# Patient Record
Sex: FEMALE | Race: WHITE | NOT HISPANIC OR LATINO | Employment: FULL TIME | URBAN - METROPOLITAN AREA
[De-identification: names, ages, dates, MRNs, and addresses within clinical notes are randomized per-mention and may not be internally consistent; named-entity substitution may affect disease eponyms.]

---

## 2017-01-08 ENCOUNTER — OFFICE VISIT (OUTPATIENT)
Dept: URGENT CARE | Facility: PHYSICIAN GROUP | Age: 27
End: 2017-01-08
Payer: COMMERCIAL

## 2017-01-08 VITALS
DIASTOLIC BLOOD PRESSURE: 68 MMHG | RESPIRATION RATE: 18 BRPM | HEART RATE: 90 BPM | TEMPERATURE: 99 F | SYSTOLIC BLOOD PRESSURE: 118 MMHG | WEIGHT: 156 LBS | OXYGEN SATURATION: 98 %

## 2017-01-08 DIAGNOSIS — J06.9 ACUTE URI: ICD-10-CM

## 2017-01-08 DIAGNOSIS — H66.003 ACUTE SUPPURATIVE OTITIS MEDIA OF BOTH EARS WITHOUT SPONTANEOUS RUPTURE OF TYMPANIC MEMBRANES, RECURRENCE NOT SPECIFIED: Primary | ICD-10-CM

## 2017-01-08 PROCEDURE — 99214 OFFICE O/P EST MOD 30 MIN: CPT | Performed by: PHYSICIAN ASSISTANT

## 2017-01-08 RX ORDER — FLUCONAZOLE 150 MG/1
150 TABLET ORAL DAILY
Qty: 1 TAB | Refills: 0 | Status: SHIPPED | OUTPATIENT
Start: 2017-01-08 | End: 2018-09-14

## 2017-01-08 RX ORDER — AMOXICILLIN 875 MG/1
875 TABLET, COATED ORAL 2 TIMES DAILY
Qty: 20 TAB | Refills: 0 | Status: SHIPPED | OUTPATIENT
Start: 2017-01-08 | End: 2018-09-14

## 2017-01-08 ASSESSMENT — ENCOUNTER SYMPTOMS: COUGH: 1

## 2017-01-08 NOTE — MR AVS SNAPSHOT
Elayne Espitia   2017 11:45 AM   Office Visit   MRN: [id number]    Department:  [facility]   Dept Phone:  [phone]    Description:  Female : 1990   Provider:  Zulma Banda PA-C           Reason for Visit     Cough congestion, HA, ear pain x 4 days      Allergies as of 2017     Allergen Noted Reactions    Latex 2017   Rash    Site specific    Sulfa Drugs 2017   Swelling      You were diagnosed with     Acute suppurative otitis media of both ears without spontaneous rupture of tympanic membranes, recurrence not specified   [[id number]]  -  Primary     Acute URI   [[id number]]         Vital Signs     Blood Pressure Pulse Temperature Respirations Weight Oxygen Saturation    118/68 mmHg 90 37.2 °C (99 °F) 18 70.761 kg (156 lb) 98%      Basic Information     Date Of Birth Sex Race Ethnicity Preferred Language    1990 Female White Non- English      Health Maintenance        Date Due Completion Dates    IMM HEP B VACCINE (1 of 3 - Primary Series) 1990 ---    IMM HEP A VACCINE (1 of 2 - Standard Series) 3/9/1991 ---    IMM HPV VACCINE (1 of 3 - Female 3 Dose Series) 3/9/2001 ---    IMM VARICELLA (CHICKENPOX) VACCINE (1 of 2 - 2 Dose Adolescent Series) 3/9/2003 ---    IMM DTaP/Tdap/Td Vaccine (1 - Tdap) 3/9/2009 ---    PAP SMEAR 3/4/2014 3/4/2011    IMM INFLUENZA (1) 2016 ---            Current Immunizations     No immunizations on file.      Below and/or attached are the medications your provider expects you to take. Review all of your home medications and newly ordered medications with your provider and/or pharmacist. Follow medication instructions as directed by your provider and/or pharmacist. Please keep your medication list with you and share with your provider. Update the information when medications are discontinued, doses are changed, or new medications (including over-the-counter products) are added; and carry medication information at all times in  the event of emergency situations     Allergies:  LATEX - Rash     SULFA DRUGS - Swelling               Medications  Valid as of: January 08, 2017 - 12:23 PM    Generic Name Brand Name Tablet Size Instructions for use    Amoxicillin (Tab) AMOXIL 875 MG Take 1 Tab by mouth 2 times a day.        Azithromycin (Tab) ZITHROMAX 250 MG 2 tabs by mouth day 1, 1 tab by mouth days 2-5        Fluconazole (Tab) DIFLUCAN 150 MG Take 1 Tab by mouth every day.        Norgestrel-Ethinyl Estradiol (Tab) LO-OVRAL 0.3-30 MG-MCG Take 1 Tab by mouth every day.        Pseudoephedrine HCl (Tab) SUDAFED 30 MG Take 60 mg by mouth every four hours as needed.        .                 Medicines prescribed today were sent to:     [id number] - [place], [place] - [address] [address] [facility] [address]    Phone: [phone] Fax: [phone]    Open 24 Hours?: No      Medication refill instructions:       If your prescription bottle indicates you have medication refills left, it is not necessary to call your provider’s office. Please contact your pharmacy and they will refill your medication.    If your prescription bottle indicates you do not have any refills left, you may request refills at any time through one of the following ways: The online [employer] system (except Urgent Care), by calling your provider’s office, or by asking your pharmacy to contact your provider’s office with a refill request. Medication refills are processed only during regular business hours and may not be available until the next business day. Your provider may request additional information or to have a follow-up visit with you prior to refilling your medication.   *Please Note: Medication refills are assigned a new Rx number when refilled electronically. Your pharmacy may indicate that no refills were authorized even though a new prescription for the same medication is available at the pharmacy. Please request the medicine by name with the pharmacy before  contacting your provider for a refill.           [employer] Access Code: [email or web address]  Expires: 2/7/2017 11:41 AM    [employer]  A secure, online tool to manage your health information     [employer]’s [employer]® is a secure, online tool that connects you to your personalized health information from the privacy of your home -- day or night - making it very easy for you to manage your healthcare. Once the activation process is completed, you can even access your medical information using the [employer] cheyanne, which is available for free in the Apple Cheyanne store or Google Play store.     [employer] provides the following levels of access (as shown below):   My Chart Features   [facility] Primary Care Doctor [facility]  Specialists [facility]  Urgent  Care Non-[facility]  Primary Care  Doctor   Email your healthcare team securely and privately 24/7 X X X    Manage appointments: schedule your next appointment; view details of past/upcoming appointments X      Request prescription refills. X      View recent personal medical records, including lab and immunizations X X X X   View health record, including health history, allergies, medications X X X X   Read reports about your outpatient visits, procedures, consult and ER notes X X X X   See your discharge summary, which is a recap of your hospital and/or ER visit that includes your diagnosis, lab results, and care plan. X X       How to register for [employer]:  1. Go to  [email or web address].  2. Click on the Sign Up Now box, which takes you to the New Member Sign Up page. You will need to provide the following information:  a. Enter your [employer] Access Code exactly as it appears at the top of this page. (You will not need to use this code after you’ve completed the sign-up process. If you do not sign up before the expiration date, you must request a new code.)   b. Enter your date of birth.   c. Enter your home email address.   d. Click Submit, and follow the next screen’s  instructions.  3. Create a [employer] ID. This will be your [employer] login ID and cannot be changed, so think of one that is secure and easy to remember.  4. Create a [employer] password. You can change your password at any time.  5. Enter your Password Reset Question and Answer. This can be used at a later time if you forget your password.   6. Enter your e-mail address. This allows you to receive e-mail notifications when new information is available in [employer].  7. Click Sign Up. You can now view your health information.    For assistance activating your [employer] account, call [phone]

## 2017-01-08 NOTE — PROGRESS NOTES
Subjective:      Elayne Espitia is a 26 y.o. female who presents with Cough    PMH:  has a past medical history of Urinary tract infection, site not specified and Bleeding from the nose.  MEDS:   Current outpatient prescriptions:   •  norgestrel-ethinyl estradiol (CRYSELLE-28) 0.3-30 MG-MCG Tab, Take 1 Tab by mouth every day., Disp: , Rfl:   •  pseudoephedrine (SUDAFED) 30 MG TABS, Take 60 mg by mouth every four hours as needed., Disp: , Rfl:   •  azithromycin (ZITHROMAX) 250 MG TABS, 2 tabs by mouth day 1, 1 tab by mouth days 2-5 (Patient not taking: Reported on 1/8/2017), Disp: 6 Tab, Rfl: 0  ALLERGIES:   Allergies   Allergen Reactions   • Latex Rash     Site specific   • Sulfa Drugs Swelling     SURGHX: No past surgical history on file.  SOCHX:  reports that she has never smoked. She does not have any smokeless tobacco history on file. She reports that she does not drink alcohol or use illicit drugs.  FH: family history includes Cancer in her father; Diabetes in her maternal grandfather, maternal grandmother, paternal grandfather, and paternal grandmother.          HPI Comments: Patient presents with:  Cough: congestion, HA, ear pain x 4 days.        Cough  This is a new problem. The current episode started in the past 7 days. The problem has been gradually worsening. The cough is productive of sputum. Associated symptoms include ear congestion, ear pain, headaches, nasal congestion and rhinorrhea. Pertinent negatives include no fever. The symptoms are aggravated by lying down. She has tried body position changes, OTC cough suppressant and rest (IBU) for the symptoms. The treatment provided mild relief.       Review of Systems   Constitutional: Negative for fever.   HENT: Positive for congestion, ear pain and rhinorrhea.    Respiratory: Positive for cough and sputum production.    Neurological: Positive for headaches. Negative for dizziness.   All other systems reviewed and are negative.         Objective:      /68 mmHg  Pulse 90  Temp(Src) 37.2 °C (99 °F)  Resp 18  Wt 70.761 kg (156 lb)  SpO2 98%     Physical Exam   Constitutional: She is oriented to person, place, and time. She appears well-developed and well-nourished.   HENT:   Head: Normocephalic and atraumatic.   Right Ear: Tympanic membrane is erythematous. A middle ear effusion is present.   Left Ear: Tympanic membrane is erythematous. A middle ear effusion is present.   Nose: Rhinorrhea present.   Mouth/Throat: Oropharynx is clear and moist.   Eyes: EOM are normal. Pupils are equal, round, and reactive to light.   Neck: Normal range of motion. Neck supple.   Cardiovascular: Normal rate, regular rhythm and normal heart sounds.    Pulmonary/Chest: Effort normal. No respiratory distress. She has rhonchi.   Abdominal: Soft.   Musculoskeletal: Normal range of motion.   Neurological: She is alert and oriented to person, place, and time.   Skin: Skin is warm and dry.   Vitals reviewed.         Assessment/Plan:     1. Acute suppurative otitis media of both ears without spontaneous rupture of tympanic membranes, recurrence not specified  amoxicillin (AMOXIL) 875 MG tablet    fluconazole (DIFLUCAN) 150 MG tablet   2. Acute URI  amoxicillin (AMOXIL) 875 MG tablet    fluconazole (DIFLUCAN) 150 MG tablet       PT can continue OTC medications, increase fluids and rest until symptoms improve.     PT should follow up with PCP in 3-5 days for re-evaluation if symptoms have not improved.    Discussed red flags and reasons to return to UC or ED.  Pt and/or family verbalized understanding of diagnosis and follow up instructions and declined  informational handout on diagnosis.  PT discharged.

## 2017-01-08 NOTE — Clinical Note
January 8, 2017         Patient: Elayne Espitia   YOB: 1990   Date of Visit: 1/8/2017           To Whom it May Concern:    Elayne Espitia was seen in my clinic on 1/8/2017. She may return to work on 01/10/2016.    If you have any questions or concerns, please don't hesitate to call.        Sincerely,           Zulma Banda PA-C  Electronically Signed

## 2017-01-10 ASSESSMENT — ENCOUNTER SYMPTOMS
SPUTUM PRODUCTION: 1
FEVER: 0
HEADACHES: 1
RHINORRHEA: 1
DIZZINESS: 0

## 2017-01-19 ENCOUNTER — HOSPITAL ENCOUNTER (OUTPATIENT)
Dept: LAB | Facility: MEDICAL CENTER | Age: 27
End: 2017-01-19
Attending: OBSTETRICS & GYNECOLOGY
Payer: COMMERCIAL

## 2017-01-19 PROCEDURE — 88175 CYTOPATH C/V AUTO FLUID REDO: CPT

## 2017-01-19 PROCEDURE — 87591 N.GONORRHOEAE DNA AMP PROB: CPT

## 2017-01-19 PROCEDURE — 87491 CHLMYD TRACH DNA AMP PROBE: CPT

## 2017-01-19 PROCEDURE — 87624 HPV HI-RISK TYP POOLED RSLT: CPT

## 2017-01-21 LAB
C TRACH DNA GENITAL QL NAA+PROBE: NEGATIVE
CYTOLOGY REG CYTOL: NORMAL
N GONORRHOEA DNA GENITAL QL NAA+PROBE: NEGATIVE
SPECIMEN SOURCE: NORMAL

## 2017-01-24 LAB
HPV HR 12 DNA CVX QL NAA+PROBE: NEGATIVE
HPV16 DNA SPEC QL NAA+PROBE: POSITIVE
HPV18 DNA SPEC QL NAA+PROBE: NEGATIVE
SPECIMEN SOURCE: ABNORMAL

## 2017-11-03 ENCOUNTER — HOSPITAL ENCOUNTER (OUTPATIENT)
Dept: LAB | Facility: MEDICAL CENTER | Age: 27
End: 2017-11-03
Attending: OBSTETRICS & GYNECOLOGY
Payer: COMMERCIAL

## 2017-11-03 PROCEDURE — 88175 CYTOPATH C/V AUTO FLUID REDO: CPT

## 2017-11-03 PROCEDURE — 87624 HPV HI-RISK TYP POOLED RSLT: CPT

## 2017-11-06 LAB — CYTOLOGY REG CYTOL: NORMAL

## 2018-02-20 ENCOUNTER — OFFICE VISIT (OUTPATIENT)
Dept: URGENT CARE | Facility: PHYSICIAN GROUP | Age: 28
End: 2018-02-20
Payer: COMMERCIAL

## 2018-02-20 VITALS
HEART RATE: 102 BPM | OXYGEN SATURATION: 98 % | TEMPERATURE: 98.8 F | RESPIRATION RATE: 16 BRPM | HEIGHT: 66 IN | WEIGHT: 163 LBS | SYSTOLIC BLOOD PRESSURE: 114 MMHG | BODY MASS INDEX: 26.2 KG/M2 | DIASTOLIC BLOOD PRESSURE: 74 MMHG

## 2018-02-20 DIAGNOSIS — H65.192 ACUTE EFFUSION OF LEFT EAR: Primary | ICD-10-CM

## 2018-02-20 DIAGNOSIS — J06.9 VIRAL URI: ICD-10-CM

## 2018-02-20 PROCEDURE — 99213 OFFICE O/P EST LOW 20 MIN: CPT | Performed by: PHYSICIAN ASSISTANT

## 2018-02-20 ASSESSMENT — ENCOUNTER SYMPTOMS
CHILLS: 1
MUSCULOSKELETAL NEGATIVE: 1
CARDIOVASCULAR NEGATIVE: 1
GASTROINTESTINAL NEGATIVE: 1
EYES NEGATIVE: 1
COUGH: 1

## 2018-02-20 NOTE — PROGRESS NOTES
Subjective:      Elayne Espitia is a 27 y.o. female who presents with Otalgia (BL poss infection)            HPI  Chief Complaint   Patient presents with   • Otalgia     BL poss infection       HPI:  Elayne Espitia is a 27 y.o. female who presents with bilateral ear pain x 4 days.  Runny nose and post nasal drip for 5 days.  Slight cough.  Chills last night. History of recurrent ear infections. Did have 2 as a child. Has tried ibuprofen with little improvement. Patient denies HA, SOB, chest pain, palpitations, fever, or n/v/d.      Past Medical History:   Diagnosis Date   • Bleeding from the nose    • Urinary tract infection, site not specified        History reviewed. No pertinent surgical history.    Family History   Problem Relation Age of Onset   • Cancer Father      brain   • Diabetes Maternal Grandmother    • Diabetes Maternal Grandfather    • Diabetes Paternal Grandmother    • Diabetes Paternal Grandfather      No pertinent family history.    Social History     Social History   • Marital status: Single     Spouse name: N/A   • Number of children: N/A   • Years of education: N/A     Occupational History   • Not on file.     Social History Main Topics   • Smoking status: Never Smoker   • Smokeless tobacco: Never Used   • Alcohol use No   • Drug use: No   • Sexual activity: No      Comment: pill     Other Topics Concern   • Not on file     Social History Narrative   • No narrative on file         Current Outpatient Prescriptions:   •  norgestrel-ethinyl estradiol, 1 Tab, Oral, DAILY  •  amoxicillin, 875 mg, Oral, BID  •  fluconazole, 150 mg, Oral, DAILY  •  pseudoephedrine, 60 mg, Oral, Q4HRS PRN, [id number]  •  azithromycin, 2 tabs by mouth day 1, 1 tab by mouth days 2-5    Allergies   Allergen Reactions   • Latex Rash     Site specific   • Sulfa Drugs Swelling        Review of Systems   Constitutional: Positive for chills.   HENT: Positive for congestion and ear pain.    Eyes: Negative.    Respiratory:  "Positive for cough.    Cardiovascular: Negative.    Gastrointestinal: Negative.    Genitourinary: Negative.    Musculoskeletal: Negative.    Skin: Negative.    All other systems reviewed and are negative.         Objective:     /74   Pulse (!) 102   Temp 37.1 °C (98.8 °F)   Resp 16   Ht 1.664 m (5' 5.5\")   Wt 73.9 kg (163 lb)   SpO2 98%   Breastfeeding? No   BMI 26.71 kg/m²      Physical Exam       Nursing note and vitals reviewed.    Constitutional:   Appropriately groomed, pleasant affect, well nourished, in NAD.    Head:   Normocephalic, atraumatic.    Eyes:   PERRLA, EOM's full, sclera white, conjunctiva not erythematous, and medial canthus without exudate bilaterally.    Ears:  Tragus does not tender to manipulation bilaterally.  No pre-auricular lymphadenopathy or mastoid ttp.  EACs with mild cerumen bilaterally, not erythematous.  TMs pearly gray with cone of light present and umbo and malleolus visible. Slight opacity to left TM with fluid level present. Serous fluid, not mucopurulent. No bulging.  Hearing grossly intact to voice.     Nose:  Nares bilaterally.  Nasal mucosa not edematous. Sinuses not tender to percussion bilaterally.    Throat:  Dentition wnl, mucosa moist without lesions.  Oropharynx mildly erythematous, with no enlargement of the palatine tonsils bilaterally with no exudates.    No post nasal drainage present.  Soft palate rises symmetrically bilaterally and uvula midline.      Neck: Neck supple, with mild anterior lymphadenopathy that is soft and mobile to palpation. Thyroid non-palpable without tenderness or nodules. No supraclavicular lymphadenopathy.    Lungs:  Respiratory effort not labored without accessory muscle use.     Musculoskeletal:  Gait non-antalgic with a narrow base.    Derm:  Skin without rashes or lesions with good turgor pressure.      Psychiatric:  Mood, affect, and judgement appropriate.       Assessment/Plan:     1. Acute effusion of left ear     2. " Viral URI       Patient presents with left effusion with serous fluid present. No fluid bubbles. Coryza present with clear rhinorrhea. Mildly erythematous oropharynx. No evidence of otitis media. Suspect viral etiology and recommended over-the-counter Sudafed medication. At least 3 times daily. Work note provided. Reviewed side symptoms of bacterial infection and when to return to clinic.    Patient was in agreement with this treatment plan and seemed to understand without barriers. All questions were encouraged and answered.  Reviewed signs and symptoms of when to seek emergency medical care.     Please note that this dictation was created using voice recognition software.  I have made every reasonable attempt to correct obvious errors, but I expect there are errors of nikos and possibly content that I did not discover before finalizing the note.

## 2018-02-20 NOTE — PATIENT INSTRUCTIONS
Sudafed every 3-4 hours for ear pressure and runny nose. (30 mg)  Take at least 3 doses a day to have effect.  Stop 3 hours before bedtime to avoid having trouble sleeping.  Flonase and nasal saline irrigation (netti pot or [place] Med Sinus Rinse).  Used distilled water or boiled tap water with nasal flushes, not straight tap water.  Humidifier at bedtime.  Hot steam showers to loosen up mucous.  Cough medicine at bedtime (nyquil).  Delsym during the day.  Lots of fluids, tea with honey.  Ibuprofen for headache, fever, chills.  Be sure to take with food.  Salt water gargles.  Return if worsening: Yellow thicker mucus changes, worsening pain around the eyes and radiates to the teeth, and fever over 101°F.

## 2018-02-20 NOTE — LETTER
February 20, 2018         Patient: Elayne Espitia   YOB: 1990   Date of Visit: 2/20/2018           To Whom it May Concern:    Elayne Espitia was seen in my clinic on 2/20/2018. Please excuse her from work today and tomorrow (2/20-2/21).  May return sooner if able.    If you have any questions or concerns, please don't hesitate to call.        Sincerely,           Stephen Holm P.A.-C.  Electronically Signed

## 2018-07-20 ENCOUNTER — HOSPITAL ENCOUNTER (OUTPATIENT)
Dept: LAB | Facility: MEDICAL CENTER | Age: 28
End: 2018-07-20
Attending: OBSTETRICS & GYNECOLOGY
Payer: COMMERCIAL

## 2018-07-20 PROCEDURE — 87591 N.GONORRHOEAE DNA AMP PROB: CPT

## 2018-07-20 PROCEDURE — 87491 CHLMYD TRACH DNA AMP PROBE: CPT

## 2018-07-20 PROCEDURE — 88175 CYTOPATH C/V AUTO FLUID REDO: CPT

## 2018-07-22 LAB — AMBIGUOUS DTTM AMBI4: NORMAL

## 2018-09-14 DIAGNOSIS — Z01.812 PRE-OPERATIVE LABORATORY EXAMINATION: ICD-10-CM

## 2018-09-14 LAB
BASOPHILS # BLD AUTO: 0.5 % (ref 0–1.8)
BASOPHILS # BLD: 0.04 K/UL (ref 0–0.12)
EOSINOPHIL # BLD AUTO: 0.11 K/UL (ref 0–0.51)
EOSINOPHIL NFR BLD: 1.5 % (ref 0–6.9)
ERYTHROCYTE [DISTWIDTH] IN BLOOD BY AUTOMATED COUNT: 40.7 FL (ref 35.9–50)
HCG SERPL QL: NEGATIVE
HCT VFR BLD AUTO: 40.3 % (ref 37–47)
HGB BLD-MCNC: 14 G/DL (ref 12–16)
IMM GRANULOCYTES # BLD AUTO: 0.03 K/UL (ref 0–0.11)
IMM GRANULOCYTES NFR BLD AUTO: 0.4 % (ref 0–0.9)
LYMPHOCYTES # BLD AUTO: 2.53 K/UL (ref 1–4.8)
LYMPHOCYTES NFR BLD: 34.1 % (ref 22–41)
MCH RBC QN AUTO: 30.9 PG (ref 27–33)
MCHC RBC AUTO-ENTMCNC: 34.7 G/DL (ref 33.6–35)
MCV RBC AUTO: 89 FL (ref 81.4–97.8)
MONOCYTES # BLD AUTO: 0.5 K/UL (ref 0–0.85)
MONOCYTES NFR BLD AUTO: 6.7 % (ref 0–13.4)
NEUTROPHILS # BLD AUTO: 4.22 K/UL (ref 2–7.15)
NEUTROPHILS NFR BLD: 56.8 % (ref 44–72)
NRBC # BLD AUTO: 0 K/UL
NRBC BLD-RTO: 0 /100 WBC
PLATELET # BLD AUTO: 311 K/UL (ref 164–446)
PMV BLD AUTO: 9.7 FL (ref 9–12.9)
RBC # BLD AUTO: 4.53 M/UL (ref 4.2–5.4)
WBC # BLD AUTO: 7.4 K/UL (ref 4.8–10.8)

## 2018-09-14 PROCEDURE — 85025 COMPLETE CBC W/AUTO DIFF WBC: CPT

## 2018-09-14 PROCEDURE — 84703 CHORIONIC GONADOTROPIN ASSAY: CPT

## 2018-09-14 PROCEDURE — 36415 COLL VENOUS BLD VENIPUNCTURE: CPT

## 2018-09-14 RX ORDER — FEXOFENADINE HCL 180 MG/1
180 TABLET ORAL DAILY
COMMUNITY

## 2018-09-15 ENCOUNTER — HOSPITAL ENCOUNTER (OUTPATIENT)
Facility: MEDICAL CENTER | Age: 28
End: 2018-09-15
Attending: OBSTETRICS & GYNECOLOGY | Admitting: OBSTETRICS & GYNECOLOGY
Payer: COMMERCIAL

## 2018-09-15 VITALS
TEMPERATURE: 97.4 F | OXYGEN SATURATION: 98 % | HEIGHT: 65 IN | HEART RATE: 61 BPM | RESPIRATION RATE: 16 BRPM | WEIGHT: 165.12 LBS | BODY MASS INDEX: 27.51 KG/M2

## 2018-09-15 DIAGNOSIS — Z09 SURGERY FOLLOW-UP: ICD-10-CM

## 2018-09-15 PROCEDURE — 160038 HCHG SURGERY MINUTES - EA ADDL 1 MIN LEVEL 2: Performed by: OBSTETRICS & GYNECOLOGY

## 2018-09-15 PROCEDURE — 88307 TISSUE EXAM BY PATHOLOGIST: CPT

## 2018-09-15 PROCEDURE — 160009 HCHG ANES TIME/MIN: Performed by: OBSTETRICS & GYNECOLOGY

## 2018-09-15 PROCEDURE — 700101 HCHG RX REV CODE 250

## 2018-09-15 PROCEDURE — A4338 INDWELLING CATHETER LATEX: HCPCS | Performed by: OBSTETRICS & GYNECOLOGY

## 2018-09-15 PROCEDURE — 700111 HCHG RX REV CODE 636 W/ 250 OVERRIDE (IP)

## 2018-09-15 PROCEDURE — 160046 HCHG PACU - 1ST 60 MINS PHASE II: Performed by: OBSTETRICS & GYNECOLOGY

## 2018-09-15 PROCEDURE — 160035 HCHG PACU - 1ST 60 MINS PHASE I: Performed by: OBSTETRICS & GYNECOLOGY

## 2018-09-15 PROCEDURE — 501838 HCHG SUTURE GENERAL: Performed by: OBSTETRICS & GYNECOLOGY

## 2018-09-15 PROCEDURE — 88305 TISSUE EXAM BY PATHOLOGIST: CPT

## 2018-09-15 PROCEDURE — 160002 HCHG RECOVERY MINUTES (STAT): Performed by: OBSTETRICS & GYNECOLOGY

## 2018-09-15 PROCEDURE — 160048 HCHG OR STATISTICAL LEVEL 1-5: Performed by: OBSTETRICS & GYNECOLOGY

## 2018-09-15 PROCEDURE — 160025 RECOVERY II MINUTES (STATS): Performed by: OBSTETRICS & GYNECOLOGY

## 2018-09-15 PROCEDURE — 502587 HCHG PACK, D&C: Performed by: OBSTETRICS & GYNECOLOGY

## 2018-09-15 PROCEDURE — 160027 HCHG SURGERY MINUTES - 1ST 30 MINS LEVEL 2: Performed by: OBSTETRICS & GYNECOLOGY

## 2018-09-15 RX ORDER — ONDANSETRON 2 MG/ML
4 INJECTION INTRAMUSCULAR; INTRAVENOUS EVERY 6 HOURS PRN
Status: CANCELLED | OUTPATIENT
Start: 2018-09-15

## 2018-09-15 RX ORDER — ACETAMINOPHEN 500 MG
1000 TABLET ORAL EVERY 6 HOURS
Status: DISCONTINUED | OUTPATIENT
Start: 2018-09-15 | End: 2018-09-15 | Stop reason: HOSPADM

## 2018-09-15 RX ORDER — SODIUM CHLORIDE, SODIUM LACTATE, POTASSIUM CHLORIDE, CALCIUM CHLORIDE 600; 310; 30; 20 MG/100ML; MG/100ML; MG/100ML; MG/100ML
INJECTION, SOLUTION INTRAVENOUS CONTINUOUS
Status: DISCONTINUED | OUTPATIENT
Start: 2018-09-15 | End: 2018-09-15 | Stop reason: HOSPADM

## 2018-09-15 RX ORDER — KETOROLAC TROMETHAMINE 30 MG/ML
30 INJECTION, SOLUTION INTRAMUSCULAR; INTRAVENOUS EVERY 6 HOURS
Status: DISCONTINUED | OUTPATIENT
Start: 2018-09-15 | End: 2018-09-15 | Stop reason: HOSPADM

## 2018-09-15 RX ORDER — IBUPROFEN 600 MG/1
600 TABLET ORAL EVERY 6 HOURS PRN
Qty: 60 TAB | Refills: 1 | Status: SHIPPED | OUTPATIENT
Start: 2018-09-15

## 2018-09-15 RX ORDER — DOCUSATE SODIUM 100 MG/1
100 CAPSULE, LIQUID FILLED ORAL 2 TIMES DAILY
Qty: 60 CAP | Refills: 1 | Status: SHIPPED | OUTPATIENT
Start: 2018-09-15

## 2018-09-15 RX ORDER — LIDOCAINE HYDROCHLORIDE 10 MG/ML
INJECTION, SOLUTION INFILTRATION; PERINEURAL
Status: COMPLETED
Start: 2018-09-15 | End: 2018-09-15

## 2018-09-15 RX ORDER — OXYCODONE HYDROCHLORIDE AND ACETAMINOPHEN 5; 325 MG/1; MG/1
1-2 TABLET ORAL EVERY 4 HOURS PRN
Qty: 15 TAB | Refills: 0 | Status: SHIPPED | OUTPATIENT
Start: 2018-09-15 | End: 2018-09-22

## 2018-09-15 RX ORDER — BUPIVACAINE HYDROCHLORIDE AND EPINEPHRINE 2.5; 5 MG/ML; UG/ML
INJECTION, SOLUTION EPIDURAL; INFILTRATION; INTRACAUDAL; PERINEURAL
Status: DISCONTINUED | OUTPATIENT
Start: 2018-09-15 | End: 2018-09-15 | Stop reason: HOSPADM

## 2018-09-15 RX ORDER — LIDOCAINE HYDROCHLORIDE 10 MG/ML
0.5 INJECTION, SOLUTION INFILTRATION; PERINEURAL
Status: DISCONTINUED | OUTPATIENT
Start: 2018-09-15 | End: 2018-09-15 | Stop reason: HOSPADM

## 2018-09-15 RX ADMIN — LIDOCAINE HYDROCHLORIDE 0.5 ML: 10 INJECTION, SOLUTION INFILTRATION; PERINEURAL at 07:25

## 2018-09-15 RX ADMIN — SODIUM CHLORIDE, SODIUM LACTATE, POTASSIUM CHLORIDE, CALCIUM CHLORIDE: 600; 310; 30; 20 INJECTION, SOLUTION INTRAVENOUS at 07:25

## 2018-09-15 RX ADMIN — FENTANYL CITRATE 50 MCG: 50 INJECTION, SOLUTION INTRAMUSCULAR; INTRAVENOUS at 09:50

## 2018-09-15 ASSESSMENT — PAIN SCALES - GENERAL
PAINLEVEL_OUTOF10: 2
PAINLEVEL_OUTOF10: 5
PAINLEVEL_OUTOF10: 1
PAINLEVEL_OUTOF10: 6
PAINLEVEL_OUTOF10: 0
PAINLEVEL_OUTOF10: 3
PAINLEVEL_OUTOF10: 3

## 2018-09-15 NOTE — OR SURGEON
Immediate Post OP Note    PreOp Diagnosis:Persistent BETH III with positive ECC  Previous LEEP    PostOp Diagnosis: same    Procedure(s):  CERVICAL CONIZATION - Wound Class: Clean Contaminated    Surgeon(s):  Sharmila Farrar M.D.    Anesthesiologist/Type of Anesthesia:  Anesthesiologist: Madhu Ferrer M.D./General    Surgical Staff:  Circulator: Sid Goodson R.N.  Scrub Person: Elizabeth Day    Specimens removed if any:  CKC and ECC    Estimated Blood Loss: less than 50 cc  IVF: 1,000 LR  UO: 100 cc, clear    Findings: Cervix without lesions.    Complications: none        9/15/2018 10:01 AM Sharmila Farrar M.D.

## 2018-09-15 NOTE — H&P
CHIEF COMPLAINT:  High-grade Pap smear and cervical biopsy and ECC with BETH   III.    HISTORY OF PRESENT ILLNESS:  This is a 28-year-old  1, para 0    female who has been having a history of high-grade Pap smears.  Patient   previously had a LEEP for BETH III and then was started on Gardasil HPV   vaccine, which patient completed.  This year, the patient has been having Pap   smears every 6 months.  Her most current Pap smear was again high-grade Pap   smear and she has persistently had HPV 16 positive.  Patient at this time had   a colposcopy with cervical biopsy and ECC on 2018 and both the cervical   biopsy and ECC were high grade for squamous intraepithelial lesion, no   invasive carcinoma seen.  At this time, I have discussed with the patient the   need to proceed with a cold knife cone.  I have reviewed with the patient the   risks, benefits, indications and alternatives to surgery.  She has no   unanswered questions and wants to proceed.    PAST MEDICAL HISTORY:  Severe cervical dysplasia.    PAST SURGICAL HISTORY:  Previous LEEP in  and previous elective   termination of pregnancy in .    MEDICATIONS:  1.  She is on Cryselle birth control pills 1 p.o. daily.  2.  She is on Xanax p.r.n.    ALLERGIES:  1.  SULFA.  2.  LATEX causes rash.    OBSTETRICAL HISTORY:  She is a  1, para 0.  Patient had elective   termination of pregnancy in 10/2014.    GYNECOLOGIC HISTORY:  Patient started menstruating at age 14, has menstrual   cycles every 28 days, last about 5 days.  Her last menstrual cycle was on   2018.  She is on the Cryselle birth control pills.  Her most current Pap   smear was high-grade Pap on 2018.  She has had persistent HPV 16.  Her   colposcopy on cervical biopsy and ECC were both positive for high-grade.    SOCIAL HISTORY:  Patient is in a monogamous relationship.  She denies tobacco,   alcohol, or drug use.    FAMILY HISTORY:   Noncontributory.    PHYSICAL EXAMINATION:  VITAL SIGNS:  Blood pressure 119/74, heart rate 70, weight 165 pounds.  GENERAL:  Pleasant female, in no acute distress.  LUNGS:  Clear to auscultation bilaterally.  CARDIOVASCULAR:  Regular rhythm.  No murmur.  ABDOMEN:  Soft, nontender, nondistended.  EXTREMITIES:  No calf tenderness.  GENITOURINARY:  Normal external female genitalia.  Vagina without any lesions,   no discharge.  Cervix, no lesions, no discharge.  Anteverted uterus about 6   weeks' size without any adnexal masses or tenderness.    LABORATORY DATA:  CBC and beta-hCG pending.    ASSESSMENT AND PLAN:  A 28-year-old  1, para 0.  1.  Severe cervical dysplasia.  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  Her most recent colposcopy was positive on the   cervical biopsy and ECC for high grade.  Patient has had a previous LEEP and   she also has completed Gardasil series.  At this time, we are proceeding with   a cold knife cone in order to treat and diagnose the severe dysplasia.  Risks,   benefits, indications and alternatives have been discussed with the patient.    She has no unanswered questions and wants to proceed.  2.  Contraception, on Cryselle birth control pills.       ____________________________________     MD CAITIE HURLEY / RUTHY    DD:  2018 22:29:35  DT:  2018 23:02:42    D#:  [id number]  Job#:  [id number]

## 2018-09-15 NOTE — H&P
CHIEF COMPLAINT:  High-grade Pap smear and cervical biopsy and ECC with BETH   III.    HISTORY OF PRESENT ILLNESS:  This is a 28-year-old  1, para 0    female who has been having a history of high-grade Pap smears.  Patient   previously had a LEEP for BETH III and then was started on Gardasil HPV   vaccine, which patient completed.  This year, the patient has been having Pap   smears every 6 months.  Her most current Pap smear was again high-grade Pap   smear and she has persistently had HPV 16 positive.  Patient at this time had   a colposcopy with cervical biopsy and ECC on 2018 and both the cervical   biopsy and ECC were high grade for squamous intraepithelial lesion, no   invasive carcinoma seen.  At this time, I have discussed with the patient the   need to proceed with a cold knife cone.  I have reviewed with the patient the   risks, benefits, indications and alternatives to surgery.  She has no   unanswered questions and wants to proceed.    PAST MEDICAL HISTORY:  Severe cervical dysplasia.    PAST SURGICAL HISTORY:  Previous LEEP in  and previous elective   termination of pregnancy in .    MEDICATIONS:  1.  She is on Cryselle birth control pills 1 p.o. daily.  2.  She is on Xanax p.r.n.    ALLERGIES:  1.  SULFA.  2.  LATEX causes rash.    OBSTETRICAL HISTORY:  She is a  1, para 0.  Patient had elective   termination of pregnancy in 10/2014.    GYNECOLOGIC HISTORY:  Patient started menstruating at age 14, has menstrual   cycles every 28 days, last about 5 days.  Her last menstrual cycle was on   2018.  She is on the Cryselle birth control pills.  Her most current Pap   smear was high-grade Pap on 2018.  She has had persistent HPV 16.  Her   colposcopy on cervical biopsy and ECC were both positive for high-grade.    SOCIAL HISTORY:  Patient is in a monogamous relationship.  She denies tobacco,   alcohol, or drug use.    FAMILY HISTORY:   Noncontributory.    PHYSICAL EXAMINATION:  VITAL SIGNS:  Blood pressure 119/74, heart rate 70, weight 165 pounds.  GENERAL:  Pleasant female, in no acute distress.  LUNGS:  Clear to auscultation bilaterally.  CARDIOVASCULAR:  Regular rhythm.  No murmur.  ABDOMEN:  Soft, nontender, nondistended.  EXTREMITIES:  No calf tenderness.  GENITOURINARY:  Normal external female genitalia.  Vagina without any lesions,   no discharge.  Cervix, no lesions, no discharge.  Anteverted uterus about 6   weeks' size without any adnexal masses or tenderness.    LABORATORY DATA:  CBC and beta-hCG pending.    ASSESSMENT AND PLAN:  A 28-year-old  1, para 0.  1.  Severe cervical dysplasia.  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  Her most recent colposcopy was positive on the   cervical biopsy and ECC for high grade.  Patient has had a previous LEEP and   she also has completed Gardasil series.  At this time, we are proceeding with   a cold knife cone in order to treat and diagnose the severe dysplasia.  Risks,   benefits, indications and alternatives have been discussed with the patient.    She has no unanswered questions and wants to proceed.  2.  Contraception, on Cryselle birth control pills.       ____________________________________     MD CAITIE HURLEY / RUTHY    DD:  2018 22:29:35  DT:  2018 23:02:42    D#:  [id number]  Job#:  [id number]

## 2018-09-15 NOTE — OR NURSING
Pt arrived to phase 2. VSS. Pt and mom educated on POC for phase 2 and discharge. Pain is 2/10 and reports she is comfortable. Pt tolerating PO without nausea.

## 2018-09-15 NOTE — OP REPORT
DATE OF SERVICE:  09/15/2018    PREOPERATIVE DIAGNOSES:  1.  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  2.  Previous loop electrosurgical procedure.  3.  History of Gardasil.    POSTOPERATIVE DIAGNOSES:  1.  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  2.  Previous loop electrosurgical procedure.  3.  History of Gardasil.    PROCEDURE PERFORMED:  Cold knife cone.    SURGEON:  Sharmila Farrar MD    ANESTHESIOLOGIST:  Madhu Ferrer MD    TYPE OF ANESTHESIA:  General endotracheal anesthesia.    IV FLUIDS:  1 liter of LR.    URINE OUTPUT:  100 mL clear urine at the end of procedure.    ESTIMATED BLOOD LOSS:  Less than 50 mL.    SPECIMENS REMOVED:  1.  The cold knife cone.  2.  ECC.    COMPLICATIONS:  None.    RECOVERY:  Stable to the PACU.    FINDINGS:  Cervix without any lesions.    DESCRIPTION OF PROCEDURE:  The patient was taken to the operating room where   she received uncomplicated general endotracheal anesthesia.  She was placed on   Thaddeus stirrups.  The external perineum was prepped, but not the vagina.  At   this time, an exam under anesthesia revealed the above findings.  The bladder   was drained with a Jameson catheter.  At this time, a weighted speculum was   placed in the vagina and Lugol was applied to the cervix.  There were no   obvious lesions on the ectocervix.  The anterior and posterior lips of the   cervix were grasped with 2 separate single tooth tenaculum and then using 0   Vicryl on a CT-1 needle, stay sutures were placed at 3 o'clock and 9 o'clock   and then at this time, a sound was placed in the cervix and using an 11 blade   scalpel, a cold knife cone was performed.  A cold knife cone was performed   with a #11 blade scalpel making a cone and excising the cervical cone.  Once   this was done, it was handed over to the scrub tech.  At 3 and 9 o'clock,   sutures were placed to identify the 3 and 9 o'clock position on the cervix.    The sound was  then removed from the cervical canal.  An ECC was performed and   then sent to pathology.  At this time, the cervical stroma was cauterized   using a roller ball cautery obtaining excellent hemostasis and then using 0   Vicryl on a CT-1 needle, a pursestring sutures were placed on the posterior   and anterior part of the cervix.  The cervix was then found to be hemostatic.    A piece of Gelfoam was then placed inside of the cervix and then the twins of   the 3 and the 9 o'clock sutures were tied in the midline; therefore, leaving   the Gelfoam in place.  Monsel's was placed at the end.  There was no active   bleeding.  Hemostasis had been achieved and then the 2 single tooth tenaculum   was removed from the anterior and posterior lips of the cervix and those sites   were hemostatic.  The speculum was then removed from the vagina.  The patient   was then taken out of [facility].  She woke up from anesthesia without   any problems and was taken to the recovery room in stable condition.  The   Jameson was removed at the end of the procedure.  Lap and needle counts were   correct x2.  No antibiotics were given.  The patient woke up from anesthesia   without any problems and was in the recovery room in stable condition.       ____________________________________     MD CAITIE HURLEY / RUTHY    DD:  09/15/2018 10:21:39  DT:  09/15/2018 10:55:14    D#:  [id number]  Job#:  [id number]    cc: Elayne Espitia

## 2018-09-15 NOTE — OR NURSING
Patient verbalizing request to go home. Patient's VSS. Pain level tolerable and denies nausea.    Dressing clean/dry/intact. IV removed. Emesis bag given to patient.    Discharge instructions reviewed with patient. All questions answered. All belongings returned to patient and prescriptions given.    '

## 2018-09-15 NOTE — DISCHARGE INSTRUCTIONS
ACTIVITY: Rest and take it easy for the first 24 hours.  A responsible adult is recommended to remain with you during that time.  It is normal to feel sleepy.  We encourage you to not do anything that requires balance, judgment or coordination.    MILD FLU-LIKE SYMPTOMS ARE NORMAL. YOU MAY EXPERIENCE GENERALIZED MUSCLE ACHES, THROAT IRRITATION, HEADACHE AND/OR SOME NAUSEA.    FOR 24 HOURS DO NOT:  Drive, operate machinery or run household appliances.  Drink beer or alcoholic beverages.   Make important decisions or sign legal documents.    SPECIAL INSTRUCTIONS:  Conization of the Cervix, Care After  Refer to this sheet in the next few weeks. These instructions provide you with information on caring for yourself after your procedure. Your health care provider may also give you more specific instructions. Your treatment has been planned according to current medical practices but problems sometimes occur. Call your health care provider if you have any problems or questions after your procedure.  WHAT TO EXPECT AFTER THE PROCEDURE  After your procedure, it is typical to have the following sensations:  · If you had a general anesthetic, you may be groggy for 2-3 hours after the procedure.  · You may have cramps (similar to menstrual cramps) for about 1 week.    · You may have a bloody discharge or light to moderate bleeding for 1-2 weeks.  The bleeding should not be heavy (for example, it should not soak 1 pad in less than 1 hour).  · You may have a black vaginal discharge that looks similar to coffee grounds. This is from the paste that was applied to the cervix to control bleeding. This is normal.  Recovery may take up to 3 weeks.   HOME CARE INSTRUCTIONS   · Arrange for someone to drive you home after the procedure.  · Only take medicines as directed by your health care provider. Do not take aspirin. It can cause bleeding.    · Take showers for the first week. Do not take baths, swim, or use hot tubs until your  health care provider says it is okay.    · Do not douche, use tampons, or have sexual intercourse until your health care provider says it is okay.    · Avoid strenuous activities, exercises, and heavy lifting for at least 7-14 days.  · You may resume your normal diet unless your health care provider advises you differently.      · If you are constipated, you may:  ¨ Take a mild laxative as directed by your health care provider.    ¨ Add fruit and bran to your diet.    ¨ Make sure to drink enough fluids to keep your urine clear or pale yellow.  · Keep follow-up appointments with your health care provider.  SEEK MEDICAL CARE IF:   · You develop a rash.    · You are dizzy or lightheaded.    · You feel nauseous.    · You develop a bad smelling vaginal discharge.  SEEK IMMEDIATE MEDICAL CARE IF:   · You have blood clots or bleeding that is heavier than a normal menstrual period (for example, soaking a pad in less than 1 hour) or you develop bright red bleeding.    · You have a fever over 101°F (38.3°C) or persistent symptoms for more than 2-3 days.    · You have a fever over 101°F (38.3°C) and your symptoms suddenly get worse.  · You have increasing cramps.    · You faint.    · You have pain when urinating.  · You have bloody urine.    · You start vomiting.    · Your pain is not relieved with your medicine.    · Your have severe or worsening pain.  MAKE SURE YOU:  · Understand these instructions.  · Will watch your condition.  · Will get help right away if you are not doing well or get worse.     This information is not intended to replace advice given to you by your health care provider. Make sure you discuss any questions you have with your health care provider.     Document Released: 12/18/2006 Document Revised: 12/23/2014 Document Reviewed: 06/13/2014  [employer] Patient Education ©2016 [employer]      DIET: To avoid nausea, slowly advance diet as tolerated, avoiding spicy or greasy foods for the first  day.  Add more substantial food to your diet according to your physician's instructions.  Babies can be fed formula or breast milk as soon as they are hungry.  INCREASE FLUIDS AND FIBER TO AVOID CONSTIPATION.    SURGICAL DRESSING/BATHING:   No dressing.  Okay to shower. No baths/ hot tubs/ soaking in water until cleared by doctor.    FOLLOW-UP APPOINTMENT:  A follow-up appointment should be arranged with your doctor in 1-2 weeks; call to schedule.    You should CALL YOUR PHYSICIAN if you develop:  Fever greater than 101 degrees F.  Pain not relieved by medication, or persistent nausea or vomiting.  Excessive bleeding (blood soaking through dressing) or unexpected drainage from the wound.  Extreme redness or swelling around the incision site, drainage of pus or foul smelling drainage.  Inability to urinate or empty your bladder within 8 hours.  Problems with breathing or chest pain.    You should call 911 if you develop problems with breathing or chest pain.  If you are unable to contact your doctor or surgical center, you should go to the nearest emergency room or urgent care center.  Physician's telephone #: Dr. Farrar [phone]    If any questions arise, call your doctor.  If your doctor is not available, please feel free to call the Surgical Center at [phone].  The Center is open Monday through Friday from 7AM to 7PM.  You can also call the regist[facility] HOTLINE open 24 hours/day, 7 days/week and speak to a nurse at [phone], or toll free at [phone].    A registered nurse may call you a few days after your surgery to see how you are doing after your procedure.    MEDICATIONS: Resume taking daily medication.  Take prescribed pain medication with food.  If no medication is prescribed, you may take non-aspirin pain medication if needed.  PAIN MEDICATION CAN BE VERY CONSTIPATING.  Take a stool softener or laxative such as senokot, pericolace, or milk of magnesia if needed.    Prescription given for  Colace (stool softener), Motrin, Percocet (pain medication).  May take pain medication at anytime.    If your physician has prescribed pain medication that includes Acetaminophen (Tylenol), do not take additional Acetaminophen (Tylenol) while taking the prescribed medication.    Depression / Suicide Risk    As you are discharged from this [facility] facility, it is important to learn how to keep safe from harming yourself.    Recognize the warning signs:  · Abrupt changes in personality, positive or negative- including increase in energy   · Giving away possessions  · Change in eating patterns- significant weight changes-  positive or negative  · Change in sleeping patterns- unable to sleep or sleeping all the time   · Unwillingness or inability to communicate  · Depression  · Unusual sadness, discouragement and loneliness  · Talk of wanting to die  · Neglect of personal appearance   · Rebelliousness- reckless behavior  · Withdrawal from people/activities they love  · Confusion- inability to concentrate     If you or a loved one observes any of these behaviors or has concerns about self-harm, here's what you can do:  · Talk about it- your feelings and reasons for harming yourself  · Remove any means that you might use to hurt yourself (examples: pills, rope, extension cords, firearm)  · Get professional help from the community (Mental Health, Substance Abuse, psychological counseling)  · Do not be alone:Call your Safe Contact- someone whom you trust who will be there for you.  · Call your local CRISIS HOTLINE 492-5578 or 696-490-3765  · Call your local Children's Mobile Crisis Response Team [place] (437) 093-3648 or www.[employer]  · Call the toll free National Suicide Prevention Hotlines   · National Suicide Prevention Lifeline 869-555-YCRL (3791)  · National Hope Line Network 800-SUICIDE (390-6032)

## 2018-10-11 ENCOUNTER — HOSPITAL ENCOUNTER (OUTPATIENT)
Dept: RADIOLOGY | Facility: MEDICAL CENTER | Age: 28
End: 2018-10-11
Attending: FAMILY MEDICINE
Payer: COMMERCIAL

## 2018-10-11 DIAGNOSIS — M54.2 NECK PAIN: ICD-10-CM

## 2018-10-11 PROCEDURE — 72050 X-RAY EXAM NECK SPINE 4/5VWS: CPT

## 2019-03-11 ENCOUNTER — HOSPITAL ENCOUNTER (OUTPATIENT)
Dept: LAB | Facility: MEDICAL CENTER | Age: 29
End: 2019-03-11
Attending: OBSTETRICS & GYNECOLOGY
Payer: COMMERCIAL

## 2019-03-11 PROCEDURE — 87624 HPV HI-RISK TYP POOLED RSLT: CPT

## 2019-03-11 PROCEDURE — 88175 CYTOPATH C/V AUTO FLUID REDO: CPT

## 2019-03-12 LAB — AMBIGUOUS DTTM AMBI4: NORMAL

## 2019-03-15 LAB
CYTOLOGY REG CYTOL: NORMAL
HPV HR 12 DNA CVX QL NAA+PROBE: NEGATIVE
HPV16 DNA SPEC QL NAA+PROBE: NEGATIVE
HPV18 DNA SPEC QL NAA+PROBE: NEGATIVE
SPECIMEN SOURCE: NORMAL

## 2019-09-17 ENCOUNTER — HOSPITAL ENCOUNTER (OUTPATIENT)
Dept: LAB | Facility: MEDICAL CENTER | Age: 29
End: 2019-09-17
Attending: OBSTETRICS & GYNECOLOGY
Payer: COMMERCIAL

## 2019-09-17 PROCEDURE — 87491 CHLMYD TRACH DNA AMP PROBE: CPT

## 2019-09-17 PROCEDURE — 87624 HPV HI-RISK TYP POOLED RSLT: CPT

## 2019-09-17 PROCEDURE — 88175 CYTOPATH C/V AUTO FLUID REDO: CPT

## 2019-09-17 PROCEDURE — 87591 N.GONORRHOEAE DNA AMP PROB: CPT

## 2019-09-26 LAB
C TRACH DNA GENITAL QL NAA+PROBE: ABNORMAL
CYTOLOGY REG CYTOL: ABNORMAL
HPV HR 12 DNA CVX QL NAA+PROBE: NEGATIVE
HPV16 DNA SPEC QL NAA+PROBE: NEGATIVE
HPV18 DNA SPEC QL NAA+PROBE: NEGATIVE
N GONORRHOEA DNA GENITAL QL NAA+PROBE: ABNORMAL
SPECIMEN SOURCE: ABNORMAL
SPECIMEN SOURCE: ABNORMAL

## 2019-09-27 LAB
C TRACH RRNA SPEC QL NAA+PROBE: NEGATIVE
N GONORRHOEA RRNA SPEC QL NAA+PROBE: NEGATIVE
SPECIMEN SOURCE: NORMAL

## 2023-06-12 ENCOUNTER — NON-PROVIDER VISIT (OUTPATIENT)
Dept: OCCUPATIONAL MEDICINE | Facility: CLINIC | Age: 33
End: 2023-06-12

## 2023-06-12 DIAGNOSIS — Z11.1 ENCOUNTER FOR PPD TEST: Primary | ICD-10-CM

## 2023-06-12 PROCEDURE — 86580 TB INTRADERMAL TEST: CPT | Performed by: NURSE PRACTITIONER

## (undated) DEVICE — GOWN WARMING STANDARD FLEX - (30/CA)

## (undated) DEVICE — CANISTER SUCTION 3000ML MECHANICAL FILTER AUTO SHUTOFF MEDI-VAC NONSTERILE LF DISP  (40EA/CA)

## (undated) DEVICE — LACTATED RINGERS INJ 1000 ML - (14EA/CA 60CA/PF)

## (undated) DEVICE — ELECTRODE3MM DISP LLETZ BALL - (10EA/CA)

## (undated) DEVICE — SUTURE GENERAL

## (undated) DEVICE — DETERGENT RENUZYME PLUS 10 OZ PACKET (50/BX)

## (undated) DEVICE — SET LEADWIRE 5 LEAD BEDSIDE DISPOSABLE ECG (1SET OF 5/EA)

## (undated) DEVICE — PENCIL ELECTSURG 10FT BTN SWH - (50/CA)

## (undated) DEVICE — NEPTUNE 4 PORT MANIFOLD - (20/PK)

## (undated) DEVICE — ELECTRODE 850 FOAM ADHESIVE - HYDROGEL RADIOTRNSPRNT (50/PK)

## (undated) DEVICE — SUCTION INSTRUMENT YANKAUER BULBOUS TIP W/O VENT (50EA/CA)

## (undated) DEVICE — KIT ANESTHESIA W/CIRCUIT & 3/LT BAG W/FILTER (20EA/CA)

## (undated) DEVICE — GLOVE BIOGEL SZ 6.5 SURGICAL PF LTX (50PR/BX 4BX/CA)

## (undated) DEVICE — SODIUM CHL IRRIGATION 0.9% 1000ML (12EA/CA)

## (undated) DEVICE — CATHETER URETHRAL FOLEY SILICONE OD16 FR 10 ML (10EA/CA)

## (undated) DEVICE — Device

## (undated) DEVICE — SET EXTENSION WITH 2 PORTS (48EA/CA) ***PART #2C8610 IS A SUBSTITUTE*****

## (undated) DEVICE — KIT ROOM DECONTAMINATION

## (undated) DEVICE — HEAD HOLDER JUNIOR/ADULT

## (undated) DEVICE — GLOVE BIOGEL SZ 7 SURGICAL PF LTX - (50PR/BX 4BX/CA)

## (undated) DEVICE — PROTECTOR ULNA NERVE - (36PR/CA)

## (undated) DEVICE — PAD SANITARY 11IN MAXI IND WRAPPED  (12EA/PK 24PK/CA)

## (undated) DEVICE — TRAY SRGPRP PVP IOD WT PRP - (20/CA)

## (undated) DEVICE — FOLEY SLIPPERY 5CC 16 FR LF ALL SILICONE (12EA/CA)

## (undated) DEVICE — TUBING CLEARLINK DUO-VENT - C-FLO (48EA/CA)

## (undated) DEVICE — SUTURE 0 VICRYL PLUS CT-2 - 27 INCH (36/BX)

## (undated) DEVICE — ELECTRODE DUAL RETURN W/ CORD - (50/PK)

## (undated) DEVICE — SENSOR SPO2 NEO LNCS ADHESIVE (20/BX) SEE USER NOTES

## (undated) DEVICE — MASK ANESTHESIA ADULT  - (100/CA)